# Patient Record
Sex: MALE | Race: WHITE | Employment: FULL TIME | URBAN - METROPOLITAN AREA
[De-identification: names, ages, dates, MRNs, and addresses within clinical notes are randomized per-mention and may not be internally consistent; named-entity substitution may affect disease eponyms.]

---

## 2018-09-17 ENCOUNTER — HOSPITAL ENCOUNTER (EMERGENCY)
Age: 38
Discharge: HOME | End: 2018-09-17
Attending: EMERGENCY MEDICINE | Admitting: EMERGENCY MEDICINE
Payer: COMMERCIAL

## 2018-09-17 VITALS
RESPIRATION RATE: 18 BRPM | DIASTOLIC BLOOD PRESSURE: 96 MMHG | SYSTOLIC BLOOD PRESSURE: 141 MMHG | HEART RATE: 82 BPM | TEMPERATURE: 98.3 F | OXYGEN SATURATION: 100 %

## 2018-09-17 DIAGNOSIS — L50.9 HIVES: Primary | ICD-10-CM

## 2018-09-17 LAB — D DIMER PPP FEU-MCNC: 0.32 UG/ML(FEU)

## 2018-09-17 PROCEDURE — 85379 FIBRIN DEGRADATION QUANT: CPT | Performed by: PHYSICIAN ASSISTANT

## 2018-09-17 PROCEDURE — 99282 EMERGENCY DEPT VISIT SF MDM: CPT

## 2018-09-17 RX ORDER — DIPHENHYDRAMINE HCL 25 MG
CAPSULE ORAL
Qty: 16 CAP | Refills: 0 | Status: SHIPPED | OUTPATIENT
Start: 2018-09-17

## 2018-09-17 NOTE — DISCHARGE INSTRUCTIONS
Hives: Care Instructions  Your Care Instructions  Hives are raised, red, itchy patches of skin. They are also called wheals or welts. They usually have red borders and pale centers. Hives range in size from ¼ inch to 3 inches or more across. They may seem to move from place to place on the skin. Several hives may form a large area of raised, red skin. You can get hives after an insect sting, after taking medicine or eating certain foods, or because of infection or stress. Other causes include plants, things you breathe in, makeup, heat, cold, sunlight, and latex. You cannot spread hives to other people. Hives may last a few minutes or a few days, but a single spot may last less than 36 hours. Follow-up care is a key part of your treatment and safety. Be sure to make and go to all appointments, and call your doctor if you are having problems. It's also a good idea to know your test results and keep a list of the medicines you take. How can you care for yourself at home? · Avoid whatever you think may have caused your hives, such as a certain food or medicine. However, you may not know the cause. · Put a cool, wet towel on the area to relieve itching. · Take an over-the-counter antihistamine, such as diphenhydramine (Benadryl), cetirizine (Zyrtec), or loratadine (Claritin), to help stop the hives and calm the itching. Read and follow directions on the label. These medicines can make you feel sleepy. Do not drive while using them. · Stay away from strong soaps, detergents, and chemicals. These can make itching worse. When should you call for help? Call 911 anytime you think you may need emergency care. For example, call if:    · You have symptoms of a severe allergic reaction. These may include:  ¨ Sudden raised, red areas (hives) all over your body. ¨ Swelling of the throat, mouth, lips, or tongue. ¨ Trouble breathing. ¨ Passing out (losing consciousness).  Or you may feel very lightheaded or suddenly feel weak, confused, or restless.    Call your doctor now or seek immediate medical care if:    · You have symptoms of an allergic reaction, such as:  ¨ A rash or hives (raised, red areas on the skin). ¨ Itching. ¨ Swelling. ¨ Belly pain, nausea, or vomiting.     · You get hives after you start a new medicine.     · Hives have not gone away after 24 hours.    Watch closely for changes in your health, and be sure to contact your doctor if:    · You do not get better as expected. Where can you learn more? Go to http://shannon-kelly.info/. Enter J305 in the search box to learn more about \"Hives: Care Instructions. \"  Current as of: November 20, 2017  Content Version: 11.7  © 9128-5007 [employer]. Care instructions adapted under license by [employer] (which disclaims liability or warranty for this information). If you have questions about a medical condition or this instruction, always ask your healthcare professional. [address] any warranty or liability for your use of this information.

## 2018-09-17 NOTE — ED PROVIDER NOTES
EMERGENCY DEPARTMENT HISTORY AND PHYSICAL EXAM 
 
5:55 PM 
 
 
Date: 9/17/2018 Patient Name: Whit Britt History of Presenting Illness Chief Complaint Patient presents with  Arm Pain History Provided By: Patient Chief Complaint: Arm redness Duration:1  Days Timing:  Acute Location: right Quality: n/a Severity: Moderate Modifying Factors: No modifying or aggravating factors were reported. Associated Symptoms: denies any other associated signs or symptoms Additional History (Context):Shayne Ospina is a 42 y.o. male with a pertinent history of thromboembolus who presents to the emergency department for evaluation of moderate acute right arm redness with onset 1 day ago with no associated sx. Pt has a hisotry of getting blood clots after surgeries and he is worried he may have another one. Denies a recent surgery. Denies SOB, CP, itchiness. Says he does have a headache upon evaluation. Has been on blood thinners but is not on them currently. Denies any recent injury. No modifying or aggravating factors were reported. No other concerns or symptoms at this time. PCP:  None Current Outpatient Prescriptions Medication Sig Dispense Refill  diphenhydrAMINE (BENADRYL) 25 mg capsule Take 1-2 tabs every 6 hours as needed. 16 Cap 0  
 warfarin (COUMADIN) 5 mg tablet Take 1 Tab by mouth daily. 15 Tab 0  
 HYDROcodone-acetaminophen (NORCO) 5-325 mg per tablet Take 1 Tab by mouth every four (4) hours as needed for Pain. 12 Tab 0  
 oxyCODONE-acetaminophen (PERCOCET)  mg per tablet Take 1 Tab by mouth.  ranitidine (ZANTAC 75) 75 mg tablet Take 75 mg by mouth two (2) times a day.  warfarin (COUMADIN) 5 mg tablet Take 1 Tab by mouth daily. 10 Tab 0 Past History Past Medical History: 
Past Medical History:  
Diagnosis Date  Acid reflux  Thromboembolus ([address] Past Surgical History: 
Past Surgical History:  
Procedure Laterality Date  HX ORTHOPAEDIC    
 R shoulder, R knee Family History: 
History reviewed. No pertinent family history. Social History: 
Social History Substance Use Topics  Smoking status: Never Smoker  Smokeless tobacco: None  Alcohol use Yes Comment: socially Allergies: 
No Known Allergies Review of Systems Review of Systems Constitutional: Negative for chills and fever. HENT: Negative for congestion, rhinorrhea and sore throat. Eyes: Negative for visual disturbance. Respiratory: Negative for cough and shortness of breath. Cardiovascular: Negative for chest pain and palpitations. Gastrointestinal: Negative for abdominal pain, nausea and vomiting. Musculoskeletal: Negative for back pain and myalgias. Skin: Positive for color change (redness on right arm). Allergic/Immunologic: Negative. Neurological: Negative for headaches. Physical Exam  
 
Visit Vitals  BP (!) 141/96  Pulse 82  Temp 98.3 °F (36.8 °C)  Resp 18  SpO2 100% Physical Exam  
Constitutional: He is oriented to person, place, and time. He appears well-developed and well-nourished. No distress. HENT:  
Head: Normocephalic and atraumatic. Eyes: Conjunctivae are normal.  
Neck: Normal range of motion. Neck supple. Cardiovascular: Normal rate, regular rhythm and normal heart sounds. Pulmonary/Chest: Effort normal and breath sounds normal. No respiratory distress. He has no wheezes. He has no rales. He exhibits no tenderness. Abdominal: Soft. Bowel sounds are normal. He exhibits no distension. There is no tenderness. There is no rebound and no guarding. Musculoskeletal: Normal range of motion. He exhibits tenderness. He exhibits no edema or deformity. Arms: 
Two 0.25cm areas of superficial erythema with raised induration c/w urticaria Neurological: He is alert and oriented to person, place, and time. Skin: Skin is warm and dry. He is not diaphoretic. Psychiatric: He has a normal mood and affect. Nursing note and vitals reviewed. Diagnostic Study Results Labs - Recent Results (from the past 12 hour(s)) D DIMER Collection Time: 09/17/18  6:00 PM  
Result Value Ref Range D DIMER 0.32 <0.46 ug/ml(FEU) Radiologic Studies - No results found. Medical Decision Making I am the first provider for this patient. I reviewed the vital signs, available nursing notes, past medical history, past surgical history, family history and social history. Vital Signs-Reviewed the patient's vital signs. Pulse Oximetry Analysis -  100% on room air - stable Records Reviewed: Nursing Notes (Time of Review: 5:55 PM) ED Course: Progress Notes, Reevaluation, and Consults: 
 
 
Provider Notes (Medical Decision Making):  
Differential Diagnosis:  Hives, allergic reaction, cellulitis, bug bites, abscess, DVT Plan:  Pt presents in NAD, mildly elevated BP with otherwise normal vitals. Exam c/w hives/bug bites. Not consistent with DVT. No recent surgery/trauma/immobilization. ddimer wnl. No indication for further work-up. Will DC home with benadryl. At this time, patient is stable and appropriate for discharge home. Patient demonstrates understanding of current diagnoses and is in agreement with the treatment plan. They are advised that while the likelihood of serious underlying condition is low at this point given the evaluation performed today, we cannot fully rule it out. They are advised to immediately return with any new symptoms or worsening of current condition. All questions have been answered. Patient is given educational material regarding their diagnoses, including danger symptoms and when to return to the ED. Diagnosis Clinical Impression: 1. Hives Disposition: [address] Follow-up Information Follow up With Details Comments Contact Info Gabrielle Call in 2 days For follow-up [address] [facility] [address] ([facility]) [id number] [phone] [facility] EMERGENCY DEPT Go to As needed, If symptoms worsen [address] 
[phone] Patient's Medications Start Taking DIPHENHYDRAMINE (BENADRYL) 25 MG CAPSULE    Take 1-2 tabs every 6 hours as needed. Continue Taking HYDROCODONE-ACETAMINOPHEN (NORCO) 5-325 MG PER TABLET    Take 1 Tab by mouth every four (4) hours as needed for Pain. OXYCODONE-ACETAMINOPHEN (PERCOCET)  MG PER TABLET    Take 1 Tab by mouth. RANITIDINE (ZANTAC 75) 75 MG TABLET    Take 75 mg by mouth two (2) times a day. WARFARIN (COUMADIN) 5 MG TABLET    Take 1 Tab by mouth daily. WARFARIN (COUMADIN) 5 MG TABLET    Take 1 Tab by mouth daily. These Medications have changed No medications on file Stop Taking No medications on file  
 
_______________________________ Scribe Attestation Ileana Hager PA-C acting as a scribe for and in the presence of Camren Carlson September 17, 2018 at 6:48 PM 
    
Provider Attestation:     
I personally performed the services described in the documentation, reviewed the documentation, as recorded by the scribe in my presence, and it accurately and completely records my words and actions.  September 17, 2018 at 6:48 PM - Carmen Carlson

## 2019-06-29 ENCOUNTER — APPOINTMENT (OUTPATIENT)
Dept: VASCULAR SURGERY | Age: 39
End: 2019-06-29
Attending: PHYSICIAN ASSISTANT
Payer: SELF-PAY

## 2019-06-29 ENCOUNTER — HOSPITAL ENCOUNTER (EMERGENCY)
Age: 39
Discharge: HOME | End: 2019-06-29
Attending: EMERGENCY MEDICINE
Payer: SELF-PAY

## 2019-06-29 VITALS
RESPIRATION RATE: 16 BRPM | SYSTOLIC BLOOD PRESSURE: 128 MMHG | HEART RATE: 84 BPM | HEIGHT: 68 IN | WEIGHT: 180 LBS | TEMPERATURE: 97.5 F | OXYGEN SATURATION: 100 % | DIASTOLIC BLOOD PRESSURE: 94 MMHG | BODY MASS INDEX: 27.28 KG/M2

## 2019-06-29 DIAGNOSIS — I82.492 ACUTE DEEP VEIN THROMBOSIS (DVT) OF OTHER SPECIFIED VEIN OF LEFT LOWER EXTREMITY (HCC): Primary | ICD-10-CM

## 2019-06-29 LAB
ANION GAP SERPL CALC-SCNC: 7 MMOL/L (ref 3–18)
BASOPHILS # BLD: 0 K/UL (ref 0–0.1)
BASOPHILS NFR BLD: 0 % (ref 0–2)
BUN SERPL-MCNC: 10 MG/DL (ref 7–18)
BUN/CREAT SERPL: 7 (ref 12–20)
CALCIUM SERPL-MCNC: 9.4 MG/DL (ref 8.5–10.1)
CHLORIDE SERPL-SCNC: 105 MMOL/L (ref 100–108)
CO2 SERPL-SCNC: 28 MMOL/L (ref 21–32)
CREAT SERPL-MCNC: 1.34 MG/DL (ref 0.6–1.3)
D DIMER PPP FEU-MCNC: 3.64 UG/ML(FEU)
DIFFERENTIAL METHOD BLD: NORMAL
EOSINOPHIL # BLD: 0.2 K/UL (ref 0–0.4)
EOSINOPHIL NFR BLD: 2 % (ref 0–5)
ERYTHROCYTE [DISTWIDTH] IN BLOOD BY AUTOMATED COUNT: 12.9 % (ref 11.6–14.5)
GLUCOSE SERPL-MCNC: 92 MG/DL (ref 74–99)
HCT VFR BLD AUTO: 42 % (ref 36–48)
HGB BLD-MCNC: 14.5 G/DL (ref 13–16)
LYMPHOCYTES # BLD: 2.8 K/UL (ref 0.9–3.6)
LYMPHOCYTES NFR BLD: 32 % (ref 21–52)
MCH RBC QN AUTO: 30.7 PG (ref 24–34)
MCHC RBC AUTO-ENTMCNC: 34.5 G/DL (ref 31–37)
MCV RBC AUTO: 88.8 FL (ref 74–97)
MONOCYTES # BLD: 0.8 K/UL (ref 0.05–1.2)
MONOCYTES NFR BLD: 9 % (ref 3–10)
NEUTS SEG # BLD: 4.9 K/UL (ref 1.8–8)
NEUTS SEG NFR BLD: 57 % (ref 40–73)
PLATELET # BLD AUTO: 203 K/UL (ref 135–420)
PMV BLD AUTO: 10.7 FL (ref 9.2–11.8)
POTASSIUM SERPL-SCNC: 3.9 MMOL/L (ref 3.5–5.5)
RBC # BLD AUTO: 4.73 M/UL (ref 4.7–5.5)
SODIUM SERPL-SCNC: 140 MMOL/L (ref 136–145)
WBC # BLD AUTO: 8.7 K/UL (ref 4.6–13.2)

## 2019-06-29 PROCEDURE — 85025 COMPLETE CBC W/AUTO DIFF WBC: CPT

## 2019-06-29 PROCEDURE — 85379 FIBRIN DEGRADATION QUANT: CPT

## 2019-06-29 PROCEDURE — 74011250637 HC RX REV CODE- 250/637: Performed by: PHYSICIAN ASSISTANT

## 2019-06-29 PROCEDURE — 80048 BASIC METABOLIC PNL TOTAL CA: CPT

## 2019-06-29 PROCEDURE — 93971 EXTREMITY STUDY: CPT

## 2019-06-29 PROCEDURE — 99283 EMERGENCY DEPT VISIT LOW MDM: CPT

## 2019-06-29 RX ADMIN — APIXABAN 10 MG: 5 TABLET, FILM COATED ORAL at 17:21

## 2019-06-29 NOTE — ED TRIAGE NOTES
Pt c/o blood clot to left calf. Finished traveling by car from [employer]. Reports history of at least 4 blood clots. Denies being on blood thinners.

## 2019-06-29 NOTE — ED PROVIDER NOTES
[facility] EMERGENCY DEPT      7:06 PM    Date: 6/29/2019  Patient Name: Adelina Hoyt    History of Presenting Illness     Chief Complaint   Patient presents with    Blood Clot       34 y.o. male with noted past medical history including DVTs x4 who presents to the emergency department complaining of left calf pain for the past day. Patient states that it is a constant throbbing and aching pain which is worse with ambulation. He has not taken anything for his symptoms. Notes just driving home from [facility] and a 40-hour car ride. States this feels typical of his prior DVTs. He is not on any blood thinners. Denies any numbness, weakness, other pain, chest pain, shortness of breath, other symptoms at this time. Patient denies any other associated signs or symptoms. Patient denies any other complaints. Nursing notes regarding the HPI and triage nursing notes were reviewed. Prior medical records were reviewed. Current Outpatient Medications   Medication Sig Dispense Refill    apixaban (ELIQUIS) 5 mg tablet 10mg PO BID x 7 days, 5mg BID thereafter. 60 Tab 0    diphenhydrAMINE (BENADRYL) 25 mg capsule Take 1-2 tabs every 6 hours as needed. 16 Cap 0    HYDROcodone-acetaminophen (NORCO) 5-325 mg per tablet Take 1 Tab by mouth every four (4) hours as needed for Pain. 12 Tab 0    oxyCODONE-acetaminophen (PERCOCET)  mg per tablet Take 1 Tab by mouth.  ranitidine (ZANTAC 75) 75 mg tablet Take 75 mg by mouth two (2) times a day. Past History     Past Medical History:  Past Medical History:   Diagnosis Date    Acid reflux     Thromboembolus ([address]        Past Surgical History:  Past Surgical History:   Procedure Laterality Date    HX ORTHOPAEDIC      R shoulder, R knee       Family History:  History reviewed. No pertinent family history.     Social History:  Social History     Tobacco Use    Smoking status: Never Smoker    Smokeless tobacco: Never Used   Substance Use Topics    Alcohol use: Yes     Comment: socially    Drug use: No       Allergies:  No Known Allergies    Patient's primary care provider (as noted in EPIC):  None    Review of Systems   Constitutional:  Denies malaise, fever, chills. Cardiac:  Denies chest pain or palpitations. Respiratory:  Denies cough, wheezing, difficulty breathing, shortness of breath. GI/ABD:  Denies injury, pain, distention, nausea, vomiting, diarrhea. Extremity/MS:  + Left calf pain. Neuro:  Denies headache, LOC, dizziness, neurologic symptoms/deficits/paresthesias. Skin: Denies injury, rash, itching or skin changes. All other systems negative as reviewed. Visit Vitals  BP (!) 128/94 (BP 1 Location: Right arm, BP Patient Position: Sitting)   Pulse 84   Temp 97.5 °F (36.4 °C)   Resp 16   Ht 5' 8\" (1.727 m)   Wt 81.6 kg (180 lb)   SpO2 100%   BMI 27.37 kg/m²       PHYSICAL EXAM:    CONSTITUTIONAL:  Alert, in no apparent distress;  well developed;  well nourished. HEAD:  Normocephalic, atraumatic. EYES:  EOMI. Non-icteric sclera. Normal conjunctiva. ENTM:  Mouth: mucous membranes moist.  NECK:  Supple  RESPIRATORY:  Chest clear, equal breath sounds, good air movement. CARDIOVASCULAR:  Regular rate and rhythm. No murmurs, rubs, or gallops. GI:  Normal bowel sounds, abdomen soft and non-tender. No rebound or guarding. BACK:  Non-tender. UPPER EXT:  Normal inspection. LOWER EXT: Reproducible tenderness to palpation of left calf, no edema present, neurovascularly intact distally with 2+ pedal pulses. 5 out of 5 strength to bilateral lower extremities. Normal gait. NEURO:  Moves all four extremities, and grossly normal motor exam.  SKIN:  No rashes;  Normal for age. PSYCH:  Alert and normal affect.     DIFFERENTIAL DIAGNOSES/ MEDICAL DECISION MAKING:  Lower leg swelling etiologies include deep venous thrombosis, acute arterial occlusion, dependent edema, phlebitis, cellulitis, swelling from venous insufficiency, arthritis, other etiologies versus a combination of the above. IMPRESSION AND MEDICAL DECISION MAKING:  Based upon the patients presentation with noted HPI and PE, along with the work up done in the emergency department, I believe that the patient is having leg pain that is concerning for possible DVT. PVL study was ordered in the emergency department. PVL shows occlusive DVT acute to the left peroneal vein. Patient was initially tachycardic at 108 bpm, repeat without any medicine was 84 bpm.  She does not have any chest pain or shortness of breath, will not evaluate for a PE. Started on Eliquis here, given a prescription for same at home. Patient given strict instructions to follow-up with primary care doctor. I discussed this patient with my attending, Dr. Dakotah Hernández, who agrees with the assessment and plan for discharge home at this time. Diagnosis:   1. Acute deep vein thrombosis (DVT) of other specified vein of left lower extremity ([address]      Disposition: Discharge    Follow-up Information     Follow up With Specialties Details Why Contact Info    Pat Whitt MD Family Practice In 3 days  [place] [id number]  [phone]      [facility] EMERGENCY DEPT Emergency Medicine  If symptoms worsen [address]  [phone]          Discharge Medication List as of 6/29/2019  5:05 PM      START taking these medications    Details   apixaban (ELIQUIS) 5 mg tablet 10mg PO BID x 7 days, 5mg BID thereafter., Print, Disp-60 Tab, R-0         CONTINUE these medications which have NOT CHANGED    Details   diphenhydrAMINE (BENADRYL) 25 mg capsule Take 1-2 tabs every 6 hours as needed. , Print, Disp-16 Cap, R-0      HYDROcodone-acetaminophen (NORCO) 5-325 mg per tablet Take 1 Tab by mouth every four (4) hours as needed for Pain. Print, 1 Tab, Disp-12 Tab, R-0      oxyCODONE-acetaminophen (PERCOCET)  mg per tablet Take 1 Tab by mouth.   Historical Med, 1 Tab ranitidine (ZANTAC 75) 75 mg tablet Take 75 mg by mouth two (2) times a day.   Historical Med, 75 mg         STOP taking these medications       warfarin (COUMADIN) 5 mg tablet Comments:   Reason for Stopping:         warfarin (COUMADIN) 5 mg tablet Comments:   Reason for Stopping:             VENTURA Wallace

## 2019-06-30 NOTE — ED NOTES
Written discharge instructions with prescription given to patient. Discharge home ambulatory with pain to left lower leg.

## 2019-09-23 ENCOUNTER — APPOINTMENT (OUTPATIENT)
Dept: GENERAL RADIOLOGY | Age: 39
End: 2019-09-23
Attending: EMERGENCY MEDICINE
Payer: COMMERCIAL

## 2019-09-23 ENCOUNTER — HOSPITAL ENCOUNTER (EMERGENCY)
Age: 39
Discharge: HOME | End: 2019-09-23
Attending: EMERGENCY MEDICINE
Payer: COMMERCIAL

## 2019-09-23 VITALS
RESPIRATION RATE: 18 BRPM | TEMPERATURE: 98.1 F | DIASTOLIC BLOOD PRESSURE: 85 MMHG | SYSTOLIC BLOOD PRESSURE: 130 MMHG | OXYGEN SATURATION: 98 % | HEART RATE: 84 BPM

## 2019-09-23 DIAGNOSIS — M79.2 NEUROPATHIC PAIN: Primary | ICD-10-CM

## 2019-09-23 LAB
ANION GAP SERPL CALC-SCNC: 11 MMOL/L (ref 3–18)
BASOPHILS # BLD: 0 K/UL (ref 0–0.1)
BASOPHILS NFR BLD: 0 % (ref 0–2)
BUN SERPL-MCNC: 16 MG/DL (ref 7–18)
BUN/CREAT SERPL: 12 (ref 12–20)
CALCIUM SERPL-MCNC: 9.8 MG/DL (ref 8.5–10.1)
CHLORIDE SERPL-SCNC: 107 MMOL/L (ref 100–111)
CO2 SERPL-SCNC: 22 MMOL/L (ref 21–32)
CREAT SERPL-MCNC: 1.33 MG/DL (ref 0.6–1.3)
DIFFERENTIAL METHOD BLD: ABNORMAL
EOSINOPHIL # BLD: 0.2 K/UL (ref 0–0.4)
EOSINOPHIL NFR BLD: 2 % (ref 0–5)
ERYTHROCYTE [DISTWIDTH] IN BLOOD BY AUTOMATED COUNT: 13.2 % (ref 11.6–14.5)
GLUCOSE SERPL-MCNC: 115 MG/DL (ref 74–99)
HCT VFR BLD AUTO: 43.5 % (ref 36–48)
HGB BLD-MCNC: 15 G/DL (ref 13–16)
LYMPHOCYTES # BLD: 4.9 K/UL (ref 0.9–3.6)
LYMPHOCYTES NFR BLD: 52 % (ref 21–52)
MCH RBC QN AUTO: 30.9 PG (ref 24–34)
MCHC RBC AUTO-ENTMCNC: 34.5 G/DL (ref 31–37)
MCV RBC AUTO: 89.7 FL (ref 74–97)
MONOCYTES # BLD: 0.7 K/UL (ref 0.05–1.2)
MONOCYTES NFR BLD: 8 % (ref 3–10)
NEUTS SEG # BLD: 3.5 K/UL (ref 1.8–8)
NEUTS SEG NFR BLD: 38 % (ref 40–73)
PLATELET # BLD AUTO: 312 K/UL (ref 135–420)
PMV BLD AUTO: 10.9 FL (ref 9.2–11.8)
POTASSIUM SERPL-SCNC: 3.8 MMOL/L (ref 3.5–5.5)
RBC # BLD AUTO: 4.85 M/UL (ref 4.7–5.5)
SODIUM SERPL-SCNC: 140 MMOL/L (ref 136–145)
TROPONIN I SERPL-MCNC: <0.02 NG/ML (ref 0–0.04)
WBC # BLD AUTO: 9.2 K/UL (ref 4.6–13.2)

## 2019-09-23 PROCEDURE — 80048 BASIC METABOLIC PNL TOTAL CA: CPT

## 2019-09-23 PROCEDURE — 84484 ASSAY OF TROPONIN QUANT: CPT

## 2019-09-23 PROCEDURE — 93005 ELECTROCARDIOGRAM TRACING: CPT

## 2019-09-23 PROCEDURE — 71046 X-RAY EXAM CHEST 2 VIEWS: CPT

## 2019-09-23 PROCEDURE — 99285 EMERGENCY DEPT VISIT HI MDM: CPT

## 2019-09-23 PROCEDURE — 85025 COMPLETE CBC W/AUTO DIFF WBC: CPT

## 2019-09-23 RX ORDER — GUAIFENESIN 100 MG/5ML
324 LIQUID (ML) ORAL
Status: DISCONTINUED | OUTPATIENT
Start: 2019-09-23 | End: 2019-09-23 | Stop reason: HOSPADM

## 2019-09-23 RX ORDER — NITROGLYCERIN 0.4 MG/1
0.4 TABLET SUBLINGUAL AS NEEDED
Status: DISCONTINUED | OUTPATIENT
Start: 2019-09-23 | End: 2019-09-23 | Stop reason: HOSPADM

## 2019-09-23 NOTE — ED NOTES
25 G to left AC removed    5:06 AM  09/23/19     Discharge instructions given to patient (name) with verbalization of understanding. Patient accompanied by friend. Patient discharged with the following prescriptions none. Patient discharged to home (destination).       Blanca Ellis RN

## 2019-09-23 NOTE — ED TRIAGE NOTES
Patient comes in to triage extremely anxious, states that he is having chest pain and that it is going to his arm and his jaw, and is arm is tingly, now his whole body is tingly and his chest really hurts. Patient hyperventilating and assisted on to the stretcher for EKG. Patient shaking and breathing rapidly. Instructed pateint to breathe in through his nose and out through his mouth to help relax for an accurate EKG. Patient constantly moving arms and head and trying to talk to me. Instructed patient several times to stay still, to not talk and to take deep breaths. After several minutes, patient finally was calm enough to get a somewhat accurate EKG. Patient states that the pain started about 15 minutes ago. Patient states that he has a history of blood clots and is on Eliquis.

## 2019-09-23 NOTE — DISCHARGE INSTRUCTIONS
Patient Education        Neuropathic Pain: Care Instructions  Your Care Instructions    Neuropathic pain is caused by pressure on or damage to your nerves. It's often simply called nerve pain. Some people feel this type of pain all the time. For others, it comes and goes. Diabetes, shingles, or an injury can cause nerve pain. Many people say the pain feels sharp, burning, or stabbing. But some people feel it as a dull ache. In some cases, it makes your skin very sensitive. So touch, pressure, and other sensations that did not hurt before may now cause pain. It's important to know that this kind of pain is real and can affect your quality of life. It's also important to know that treatment can help. Treatment includes pain medicines, exercise, and physical therapy. Medicines can help reduce the number of pain signals that travel over the nerves. This can make the painful areas less sensitive. It can also help you sleep better and improve your mood. But medicines are only one part of successful treatment. Most people do best with more than one kind of treatment. Your doctor may recommend that you try cognitive-behavioral therapy and stress management. Or, if needed, you may decide to try to quit smoking, lower your blood pressure, or better control blood sugar. These kinds of healthy changes can also make a difference. If you feel that your treatment is not working, talk to your doctor. And be sure to tell your doctor if you think you might be depressed or anxious. These are common problems that can also be treated. Follow-up care is a key part of your treatment and safety. Be sure to make and go to all appointments, and call your doctor if you are having problems. It's also a good idea to know your test results and keep a list of the medicines you take. How can you care for yourself at home? · Be safe with medicines. Read and follow all instructions on the label.   ? If the doctor gave you a prescription medicine for pain, take it as prescribed. ? If you are not taking a prescription pain medicine, ask your doctor if you can take an over-the-counter medicine. · Save hard tasks for days when you have less pain. Follow a hard task with an easy task. And remember to take breaks. · Relax, and reduce stress. You may want to try deep breathing or meditation. These can help. · Keep moving. Gentle, daily exercise can help reduce pain. Your doctor or physical therapist can tell you what type of exercise is best for you. This may include walking, swimming, and stationary biking. It may also include stretches and range-of-motion exercises. · Try heat, cold packs, and massage. · Get enough sleep. Constant pain can make you more tired. If the pain makes it hard to sleep, talk with your doctor. · Think positively. Your thoughts can affect your pain. Do fun things to distract yourself from the pain. See a movie, read a book, listen to music, or spend time with a friend. · Keep a pain diary. Try to write down how strong your pain is and what it feels like. Also try to notice and write down how your moods, thoughts, sleep, activities, and medicine affect your pain. These notes can help you and your doctor find the best ways to treat your pain. Reducing constipation caused by pain medicine  Pain medicines often cause constipation. To reduce constipation:  · Include fruits, vegetables, beans, and whole grains in your diet each day. These foods are high in fiber. · Drink plenty of fluids, enough so that your urine is light yellow or clear like water. If you have kidney, heart, or liver disease and have to limit fluids, talk with your doctor before you increase the amount of fluids you drink. · Get some exercise every day. Build up slowly to 30 to 60 minutes a day on 5 or more days of the week. · Take a fiber supplement, such as Citrucel or Metamucil, every day if needed.  Read and follow all instructions on the label.  · Schedule time each day for a bowel movement. Having a daily routine may help. Take your time and do not strain when having a bowel movement. · Ask your doctor about a laxative. The goal is to have one easy bowel movement every 1 to 2 days. Do not let constipation go untreated for more than 3 days. When should you call for help? Call your doctor now or seek immediate medical care if:    · You feel sad, anxious, or hopeless for more than a few days. This could mean you are depressed. Depression is common in people who have a lot of pain. But it can be treated.     · You have trouble with bowel movements, such as:  ? No bowel movement in 3 days. ? Blood in the anal area, in your stool, or on the toilet paper. ? Diarrhea for more than 24 hours.    Watch closely for changes in your health, and be sure to contact your doctor if:    · Your pain is getting worse.     · You can't sleep because of pain.     · You are very worried or anxious about your pain.     · You have trouble taking your pain medicine.     · You have any concerns about your pain medicine or its side effects.     · You have vomiting or cramps for more than 2 hours. Where can you learn more? Go to http://shannon-kelly.info/. Enter O394 in the search box to learn more about \"Neuropathic Pain: Care Instructions. \"  Current as of: March 28, 2019  Content Version: 12.2  © 5053-8491 [employer]. Care instructions adapted under license by [employer] (which disclaims liability or warranty for this information). If you have questions about a medical condition or this instruction, always ask your healthcare professional. [address] any warranty or liability for your use of this information.

## 2019-09-23 NOTE — ED NOTES
Patient states he was watching tv and developed tingling in his left arm, tingling moved up arm and then chest began to hurt. Upon arrival to ED patient hyperventilating and very anxious. Patient now much calmer and lying comfortably on stretcher. Respirations and heart have slowed. Patient states he has pain under left breast. Denies SOB.

## 2019-09-23 NOTE — ED PROVIDER NOTES
EMERGENCY DEPARTMENT HISTORY AND PHYSICAL EXAM      Date: 9/23/2019  Patient Name: Liliana Merlos    History of Presenting Illness     Chief Complaint   Patient presents with    Chest Pain       History (Context): Liliana Merlos is a 34 y.o. 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  This was accompanied by anxiety. No exacerbating/relieving features or other associated symptoms. The patient does say he just fell off. The patient has no symptoms at present. On review of systems, the patient denies fever, chills, rashes, neck stiffness, numbness, weakness, tingling, vision changes, dizziness. PCP: None    Current Facility-Administered Medications   Medication Dose Route Frequency Provider Last Rate Last Dose    aspirin chewable tablet 324 mg  324 mg Oral NOW Marjan Garcia MD   Stopped at 09/23/19 0255    nitroglycerin (NITROSTAT) tablet 0.4 mg  0.4 mg SubLINGual PRN Marjan Garcia MD         Current Outpatient Medications   Medication Sig Dispense Refill    apixaban (ELIQUIS) 5 mg tablet 10mg PO BID x 7 days, 5mg BID thereafter. 60 Tab 0    diphenhydrAMINE (BENADRYL) 25 mg capsule Take 1-2 tabs every 6 hours as needed. 16 Cap 0    HYDROcodone-acetaminophen (NORCO) 5-325 mg per tablet Take 1 Tab by mouth every four (4) hours as needed for Pain. 12 Tab 0    oxyCODONE-acetaminophen (PERCOCET)  mg per tablet Take 1 Tab by mouth.  ranitidine (ZANTAC 75) 75 mg tablet Take 75 mg by mouth two (2) times a day.            Past History     Past Medical History:  Past Medical History:   Diagnosis Date    Acid reflux     Thromboembolus ([address]        Past Surgical History:  Past Surgical History:   Procedure Laterality Date    HX ORTHOPAEDIC      R shoulder, R knee       Family History:  No family history on file.    Social History:  Social History     Tobacco Use    Smoking status: Never Smoker    Smokeless tobacco: Never Used   Substance Use Topics    Alcohol use: Yes     Comment: socially    Drug use: No       Allergies:  No Known Allergies    PMH, PSH, family history, social history, allergies reviewed with the patient with significant items noted above. Review of Systems   As per HPI, otherwise reviewed and negative. Physical Exam     Vitals:    09/23/19 0127 09/23/19 0129 09/23/19 0135 09/23/19 0147   BP:   141/86 127/85   Pulse: (!) 114   93   Resp: 15   19   Temp:  98.1 °F (36.7 °C)     SpO2: 99%   100%       Gen: Well-appearing, in no acute distress  HEENT: Normocephalic, sclera anicteric  Cardiovascular: Normal rate, regular rhythm, no murmurs, rubs, gallops. Pulses intact and equal distally. Pulmonary: No respiratory distress. No stridor. Clear lungs. ABD: Soft, nontender, nondistended. Neuro: Alert and oriented x3, cranial nerves II through XII intact, pupils equal round reactive to light, normal facial symmetry, full strength and sensation throughout, 2+ reflexes in the bilateral patella, no Babinski, normal gait, rapid alternating motions normal, normal finger-nose-finger  Psych: Normal thought content and thought processes. : No CVA tenderness  EXT: Moves all extremities well. No cyanosis or clubbing. Skin: Warm and well-perfused.           Diagnostic Study Results     Labs -     Recent Results (from the past 12 hour(s))   EKG, 12 LEAD, INITIAL    Collection Time: 09/23/19  1:17 AM   Result Value Ref Range    Ventricular Rate 126 BPM    Atrial Rate 125 BPM    P-R Interval 190 ms    QRS Duration 92 ms    Q-T Interval 398 ms    QTC Calculation (Bezet) 576 ms    Calculated P Axis 62 degrees    Calculated R Axis 66 degrees    Calculated T Axis 36 degrees    Diagnosis       Undetermined rhythm  Nonspecific ST and T wave abnormality  Abnormal ECG  When compared with ECG of 08-MAY-2016 20:47,  Current undetermined rhythm precludes rhythm comparison, needs review  T wave inversion no longer evident in Anterior leads     CBC WITH AUTOMATED DIFF    Collection Time: 09/23/19  1:45 AM   Result Value Ref Range    WBC 9.2 4.6 - 13.2 K/uL    RBC 4.85 4.70 - 5.50 M/uL    HGB 15.0 13.0 - 16.0 g/dL    HCT 43.5 36.0 - 48.0 %    MCV 89.7 74.0 - 97.0 FL    MCH 30.9 24.0 - 34.0 PG    MCHC 34.5 31.0 - 37.0 g/dL    RDW 13.2 11.6 - 14.5 %    PLATELET 118 064 - 988 K/uL    MPV 10.9 9.2 - 11.8 FL    NEUTROPHILS 38 (L) 40 - 73 %    LYMPHOCYTES 52 21 - 52 %    MONOCYTES 8 3 - 10 %    EOSINOPHILS 2 0 - 5 %    BASOPHILS 0 0 - 2 %    ABS. NEUTROPHILS 3.5 1.8 - 8.0 K/UL    ABS. LYMPHOCYTES 4.9 (H) 0.9 - 3.6 K/UL    ABS. MONOCYTES 0.7 0.05 - 1.2 K/UL    ABS. EOSINOPHILS 0.2 0.0 - 0.4 K/UL    ABS.  BASOPHILS 0.0 0.0 - 0.1 K/UL    DF AUTOMATED     METABOLIC PANEL, BASIC    Collection Time: 09/23/19  1:45 AM   Result Value Ref Range    Sodium 140 136 - 145 mmol/L    Potassium 3.8 3.5 - 5.5 mmol/L    Chloride 107 100 - 111 mmol/L    CO2 22 21 - 32 mmol/L    Anion gap 11 3.0 - 18 mmol/L    Glucose 115 (H) 74 - 99 mg/dL    BUN 16 7.0 - 18 MG/DL    Creatinine 1.33 (H) 0.6 - 1.3 MG/DL    BUN/Creatinine ratio 12 12 - 20      GFR est AA >60 >60 ml/min/1.73m2    GFR est non-AA 60 (L) >60 ml/min/1.73m2    Calcium 9.8 8.5 - 10.1 MG/DL   TROPONIN I    Collection Time: 09/23/19  1:45 AM   Result Value Ref Range    Troponin-I, QT <0.02 0.0 - 0.045 NG/ML   EKG, 12 LEAD, INITIAL    Collection Time: 09/23/19  1:50 AM   Result Value Ref Range    Ventricular Rate 115 BPM    Atrial Rate 115 BPM    P-R Interval 184 ms    QRS Duration 82 ms    Q-T Interval 308 ms    QTC Calculation (Bezet) 426 ms    Calculated P Axis 27 degrees    Calculated R Axis 40 degrees    Calculated T Axis 28 degrees    Diagnosis       Sinus tachycardia  Anteroseptal infarct , age undetermined  Abnormal ECG  When compared with ECG of 23-SEP-2019 01:20,  Anteroseptal infarct is now present  ST no longer depressed in Anterior leads         Radiologic Studies -   XR CHEST PA LAT    (Results Pending)     CT Results  (Last 48 hours)    None        CXR Results  (Last 48 hours)    None            Medical Decision Making   I am the first provider for this patient. I reviewed the vital signs, available nursing notes, past medical history, past surgical history, family history and social history. Vital Signs-Reviewed the patient's vital signs. EKG: Interpreted by myself. Depressions in leads V1 and V2 prompted posterior EKG which was normal.     Records Reviewed: Personally, on initial evaluation    MDM:   Patient presents with right arm tingling rating into the right chest and central chest associate with anxiety and shortness of breath. This has resolved. Exam significant for normal exam including full neuro exam.   DDX considered: Anxiety attack, panic, radiculopathy, ACS  DDX thought to be less likely but also considered due to high risk condition: DVT, PE    Patient condition on initial evaluation: Stable    Plan:   Pain Control  Close Observation      Orders as below:  Orders Placed This Encounter    XR CHEST PA LAT    CBC WITH AUTOMATED DIFF    METABOLIC PANEL, BASIC    TROPONIN I    CARDIAC MONITORING    EKG, 12 LEAD, INITIAL    EKG, 12 LEAD, INITIAL    aspirin chewable tablet 324 mg    nitroglycerin (NITROSTAT) tablet 0.4 mg        ED Course:   Patient reevaluated. Patient feels well. Patient will be discharged home. Work-up negative. Nitroglycerin did not relieve pain. Patient condition at time of disposition: Stable  DISCHARGE NOTE:   Pt has been reexamined. Patient has no new complaints, changes, or physical findings. Care plan outlined and precautions discussed. Results were reviewed with the patient. All medications were reviewed with the patient; will d/c home with no changes to meds. All of pt's questions and concerns were addressed.   Alarm symptoms and return precautions associated with chief complaint and evaluation were reviewed with the patient in detail. The patient demonstrated adequate understanding. Patient was instructed and agrees to follow up with PCP, as well as to return to the ED upon further deterioration. Patient is ready to go home. The patient is very happy with this plan    Follow-up Information     Follow up With Specialties Details Why [facility] EMERGENCY DEPT Emergency Medicine  As needed, If symptoms worsen [address]  [phone]          Current Discharge Medication List          Procedures:  Procedures      Critical Care Time:     Disposition: Discharge home    Diagnosis     Clinical Impression:   1. Neuropathic pain        Signed,  Grey Hunter MD  Emergency Physician  [facility]    As a voice dictation software was utilized to dictate this note, minor word transpositions can occur. I apologize for confusing wording and typographic errors. Please feel free to contact me for clarification.

## 2019-09-23 NOTE — ED NOTES
Patient sitting comfortably on stretcher, remains of cardiac, BP and pulse ox monitors. Denies any chest pain at this time.

## 2019-09-24 LAB
ATRIAL RATE: 115 BPM
ATRIAL RATE: 117 BPM
CALCULATED P AXIS, ECG09: 27 DEGREES
CALCULATED P AXIS, ECG09: 46 DEGREES
CALCULATED R AXIS, ECG10: 40 DEGREES
CALCULATED R AXIS, ECG10: 65 DEGREES
CALCULATED T AXIS, ECG11: 28 DEGREES
CALCULATED T AXIS, ECG11: 36 DEGREES
DIAGNOSIS, 93000: NORMAL
DIAGNOSIS, 93000: NORMAL
P-R INTERVAL, ECG05: 184 MS
P-R INTERVAL, ECG05: 212 MS
Q-T INTERVAL, ECG07: 308 MS
Q-T INTERVAL, ECG07: 326 MS
QRS DURATION, ECG06: 100 MS
QRS DURATION, ECG06: 82 MS
QTC CALCULATION (BEZET), ECG08: 426 MS
QTC CALCULATION (BEZET), ECG08: 454 MS
VENTRICULAR RATE, ECG03: 115 BPM
VENTRICULAR RATE, ECG03: 117 BPM

## 2022-05-09 ENCOUNTER — TRANSCRIBE ORDER (OUTPATIENT)
Dept: SCHEDULING | Age: 42
End: 2022-05-09

## 2022-05-09 DIAGNOSIS — D50.9 IRON DEFICIENCY ANEMIA: Primary | ICD-10-CM

## 2022-05-09 DIAGNOSIS — Z86.010 PERSONAL HISTORY OF COLONIC POLYPS: ICD-10-CM

## 2023-04-23 DIAGNOSIS — Z86.010 PERSONAL HISTORY OF COLONIC POLYPS: Primary | ICD-10-CM
